# Patient Record
Sex: MALE | Race: WHITE | NOT HISPANIC OR LATINO | URBAN - METROPOLITAN AREA
[De-identification: names, ages, dates, MRNs, and addresses within clinical notes are randomized per-mention and may not be internally consistent; named-entity substitution may affect disease eponyms.]

---

## 2017-09-30 ENCOUNTER — EMERGENCY (EMERGENCY)
Facility: HOSPITAL | Age: 22
LOS: 1 days | Discharge: HOME | End: 2017-09-30
Admitting: EMERGENCY MEDICINE
Payer: COMMERCIAL

## 2017-09-30 VITALS
OXYGEN SATURATION: 98 % | HEART RATE: 70 BPM | RESPIRATION RATE: 16 BRPM | SYSTOLIC BLOOD PRESSURE: 152 MMHG | TEMPERATURE: 98 F | DIASTOLIC BLOOD PRESSURE: 78 MMHG

## 2017-09-30 DIAGNOSIS — F39 UNSPECIFIED MOOD [AFFECTIVE] DISORDER: ICD-10-CM

## 2017-09-30 DIAGNOSIS — F60.2 ANTISOCIAL PERSONALITY DISORDER: ICD-10-CM

## 2017-09-30 PROCEDURE — 99284 EMERGENCY DEPT VISIT MOD MDM: CPT | Mod: 25

## 2017-09-30 PROCEDURE — 90792 PSYCH DIAG EVAL W/MED SRVCS: CPT | Mod: GC
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
## 2017-09-30 NOTE — ED PROVIDER NOTE - OBJECTIVE STATEMENT
23 y/o hx Bipolar BIB parent w c/o agitation and irritability stating " I just cant keep up with this feeling". Admits to suicidal thought's but denies any plan. States   ' I cant hurt myself". Admits that he has stopped taking his lithium and haldol 2 years ago.   Denies falling ,punching or kicking any objects. Denies pain, SOB, fever, chills , chest/abdominal discomfort. Denies SI/HI/AH/VH.  Denies recent use of alcohol or  illicit drugs.

## 2017-09-30 NOTE — ED BEHAVIORAL HEALTH ASSESSMENT NOTE - REFERRAL / APPOINTMENT DETAILS
patient to continue treatment with therapist Rae Middleton; patient willing to return to psychiatrist Duong Jara
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
## 2017-09-30 NOTE — ED BEHAVIORAL HEALTH ASSESSMENT NOTE - RISK ASSESSMENT
Patient is currently at moderate risk of harm to self and others, and does not appear to be higher than at baseline. Denies acute risk factors including current active SI, akathisia, refractory insomnia, and psychic anxiety. Chronic risk factors include single, no dependents, unemployed, impulsivity, pending court date with likely sentencing, family history of suicide. Protective factors include no history of suicide attempts (suicidal gesture by wrapping cord around neck in school as above), no substance, supportive family.

## 2017-09-30 NOTE — ED BEHAVIORAL HEALTH ASSESSMENT NOTE - DETAILS
see HPI patient and mother antidepressants and stimulants "flipped him into juan david" per mother "both sides have bipolar disorder," Grandfather's brother committed suicide
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
## 2017-09-30 NOTE — ED BEHAVIORAL HEALTH ASSESSMENT NOTE - SUICIDE PROTECTIVE FACTORS
Supportive social network or family/Positive therapeutic relationships/Fear of death or dying due to pain/suffering

## 2017-09-30 NOTE — ED PROVIDER NOTE - MEDICAL DECISION MAKING DETAILS
23 y/o hx Bipolar  Medical evaluation performed. There is no clinical evidence of intoxication or any acute medical problem requiring immediate intervention. Patient is awaiting psychiatric consultation. Final disposition will be determined by psychiatrist.
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
## 2024-04-08 NOTE — ED BEHAVIORAL HEALTH ASSESSMENT NOTE - AXIS IV
Left message for patient to call back for results   Problems with interaction with legal system/Educational problems/Occupational problems